# Patient Record
Sex: FEMALE | Race: BLACK OR AFRICAN AMERICAN | Employment: OTHER | URBAN - METROPOLITAN AREA
[De-identification: names, ages, dates, MRNs, and addresses within clinical notes are randomized per-mention and may not be internally consistent; named-entity substitution may affect disease eponyms.]

---

## 2024-09-24 ENCOUNTER — OFFICE VISIT (OUTPATIENT)
Dept: ORTHOPEDIC SURGERY | Age: 74
End: 2024-09-24
Payer: MEDICARE

## 2024-09-24 DIAGNOSIS — M47.812 FACET ARTHRITIS OF CERVICAL REGION: Primary | ICD-10-CM

## 2024-09-24 PROCEDURE — 64491 INJ PARAVERT F JNT C/T 2 LEV: CPT | Performed by: ANESTHESIOLOGY

## 2024-09-24 PROCEDURE — 64490 INJ PARAVERT F JNT C/T 1 LEV: CPT | Performed by: ANESTHESIOLOGY

## 2024-09-24 RX ORDER — METHYLPREDNISOLONE ACETATE 40 MG/ML
40 INJECTION, SUSPENSION INTRA-ARTICULAR; INTRALESIONAL; INTRAMUSCULAR; SOFT TISSUE ONCE
Status: COMPLETED | OUTPATIENT
Start: 2024-09-24 | End: 2024-09-24

## 2024-09-24 RX ADMIN — METHYLPREDNISOLONE ACETATE 40 MG: 40 INJECTION, SUSPENSION INTRA-ARTICULAR; INTRALESIONAL; INTRAMUSCULAR; SOFT TISSUE at 11:41

## 2024-10-21 ENCOUNTER — OFFICE VISIT (OUTPATIENT)
Age: 74
End: 2024-10-21
Payer: MEDICARE

## 2024-10-21 DIAGNOSIS — M47.812 FACET ARTHRITIS OF CERVICAL REGION: Primary | ICD-10-CM

## 2024-10-21 PROCEDURE — 99214 OFFICE O/P EST MOD 30 MIN: CPT | Performed by: PHYSICIAN ASSISTANT

## 2024-10-21 PROCEDURE — 1123F ACP DISCUSS/DSCN MKR DOCD: CPT | Performed by: PHYSICIAN ASSISTANT

## 2024-10-21 RX ORDER — TIZANIDINE 2 MG/1
2 TABLET ORAL 3 TIMES DAILY PRN
Qty: 90 TABLET | Refills: 2 | Status: SHIPPED | OUTPATIENT
Start: 2024-10-21 | End: 2025-01-19

## 2024-10-21 ASSESSMENT — ENCOUNTER SYMPTOMS
SHORTNESS OF BREATH: 0
EYES NEGATIVE: 1
ALLERGIC/IMMUNOLOGIC NEGATIVE: 1
ABDOMINAL PAIN: 0

## 2024-10-21 NOTE — PATIENT INSTRUCTIONS
Pain Management Pre-procedure Instructions     MRN: Demetrice Munoz      : 1950    PROVIDER: Peng Mg MD   LOCATION:  [address] [place] [address]     Prior to Procedure   If you develop infectious symptoms (fever, cough, etc.) or are placed on antibiotics, call office at [phone]. You will be transferred to the medical assistant who will discuss your symptoms with the physician. The physician will decide if the procedure needs to be rescheduled.   If you need to reschedule your procedure for any reason, called the office at [phone] at least 24 hours before your scheduled procedure time.   ? If this box is checked, you are on a blood thinner ( ).   You DO/DO NOT need to stop this medication ( days) before your procedure.   ? If this box is checked, we will require clearance by your cardiologist or primary care   before discontinuing your blood thinner ( ). Someone from our   office will call you with instructions on when to stop the medication. DO NOT stop the medication on your own.       Day of Procedure   You must arrive AT LEAST 30 minutes prior to your scheduled procedure to allow for check-in/preparation. Failure to arrive 30 minutes early WILL cause your procedure to be rescheduled.   You can expect to be at the facility for about 1-1.5 hours, although the procedure itself is typically short.   You may shower, dress, and wear makeup as you normally would. Please remove fingernail polish from at least one index finger.   Take all prescribed medications other than medications you have been specifically told to stop. If you need to reschedule your procedure for any reason, called the office at [phone] at least 24 hours before your scheduled procedure time.   If you are diabetic, record your blood sugar in the morning and bring it with you to your procedure.

## 2024-10-21 NOTE — PROGRESS NOTES
Chronic Pain Consult Note      Plan:     A comprehensive pain management plan may consist of the following: Testing, Therapy, Medications, Interventions, Consults, and Follow up.    Cervical Facet Arthritis  Chronic now by definition of >3 months duration.   S/p cervical facet steroid injections bilateral C2/3 and 3/4.  S/p C2/3 facet steroid injections and R C3/4 facet steroid injections with 100% pain relief x 3 weeks.  Plan for MBB bilaterally at C2/3 and C3/4 with goal of transient pain relief x 2 injections. Goal is 80% pain reduction for duration of anesthetic. If successful, we will proceed with MBB #2 with goal of RFA. Expectations of MBB discussed with patient including going and doing something that is typically difficult to do for him the day of the procedure to gauge effectiveness. If not effective, we do not proceed with MBB #2. Patient should continue previously taught HEP.   She has completed conservative care including >3 months of physical therapy.   Initiate Tizanidine 2mg up to three times daily watching for sedation.   Initiate Voltaren gel topically up to four times daily.   Recommend heat and gentle stretching 2-3 times per day for 15-20 minutes.   MRI reviewed.  Notes from procedure reviewed.     General Recommendations: The pain condition that the patient suffers from is best treated with a multidisciplinary approach that involves an increase in physical activity to prevent de-conditioning and worsening of the pain cycle, as well as psychological counseling (formal and/or informal) to address the co morbid psychological effects of pain. Treatment will often involve judicious use of pain medications and interventional procedures to decrease the pain, allowing the patient to participate in the physical activity that will ultimately produce long-lasting pain reductions. The goal of the multidisciplinary approach is to return the patient to a higher level of overall function and to restore their

## 2024-12-24 NOTE — PROGRESS NOTES
Procedure Date: 2025  Location: [facility]        Procedure: Bilateral C2/3, C3/4 MBB #1       Time Out performed prior to start of the procedure:       Peng Mg M.D.  performed the following reviews on Demetrice Munoz 1950 prior to the start of the procedure:       patient was identified by name and     agreement on procedure being performed was verified   risks and benefits explained to patient by the provider  procedure site verified as Bilateral  patient was positioned for comfort   consent signed and verified for procedure       Time:  9:00 AM        Procedure performed by:   Peng Mg M.D.       Patient assisted by:   JENIFER PARKER MA

## 2025-01-02 ENCOUNTER — OFFICE VISIT (OUTPATIENT)
Dept: ORTHOPEDIC SURGERY | Age: 75
End: 2025-01-02
Payer: MEDICARE

## 2025-01-02 DIAGNOSIS — M47.812 CERVICAL SPONDYLOSIS WITHOUT MYELOPATHY: Primary | ICD-10-CM

## 2025-01-02 PROCEDURE — 64491 INJ PARAVERT F JNT C/T 2 LEV: CPT | Performed by: ANESTHESIOLOGY

## 2025-01-02 PROCEDURE — 64490 INJ PARAVERT F JNT C/T 1 LEV: CPT | Performed by: ANESTHESIOLOGY

## 2025-01-02 NOTE — PROGRESS NOTES
DACMBBBL   Name: Demetrice Munoz   MRN:[id number]   :1950    [facility]: [facility]    Procedure: Bilateral Cervical Medial Branch Block at C2-4 Under Fluoroscopic Imaging     Pre-op Diagnosis: Cervical Spondylosis without Myelopathy    Post-op Diagnosis: Same     Anesthesia: Local only     Complications: None    After confirming written and informed consent and discussing the risk, benefits and alternatives for the procedure, the patient had the correct site marked by the physician performing the procedure. The specific risks of bleeding and infection were discussed. The patient was taken to the fluoroscopy suite. A pulse oximeter was placed, and visual and verbal monitoring were maintained throughout the procedure. The patient was then placed in the prone position. The skin overlying the cervical spine was then prepped with chlorhexidine gluconate and a sterile drape was placed. The hands were cleaned with an alcohol-containing solution. Sterile gloves were then worn. A time out was then performed involving the physician, radiation technologist and the patient.    A posterior view of the right C2 articular pillar was then obtained. The skin overlying this area was anesthetized with 0.2 ml of 1% lidocaine using a 25 G 1.5 inch needle. Next, using intermittent fluoroscopic guidance, a 25 G, 2 inch Quincke needle was advanced using a coaxial technique toward the middle one-third section of the pillar with respect to the cephalad/caudad relationship and anterior/posterior relationship of the pillar. Satisfactory needle position was confirmed on AP, lateral, and oblique visualizations. 0.3 ml of 0.25% bupivacaine was then injected.    A posterior view of the right C3 articular pillar was then obtained. The skin overlying this area was anesthetized with 0.2 ml of 1% lidocaine using a 25 G 1.5 inch needle. Next, using intermittent fluoroscopic guidance, a 25 G, 2 inch Quincke needle was advanced using a coaxial

## 2025-01-03 ENCOUNTER — TELEPHONE (OUTPATIENT)
Age: 75
End: 2025-01-03
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
## 2025-01-09 ENCOUNTER — OFFICE VISIT (OUTPATIENT)
Dept: ORTHOPEDIC SURGERY | Age: 75
End: 2025-01-09
Payer: MEDICARE

## 2025-01-09 DIAGNOSIS — M47.812 CERVICAL SPONDYLOSIS WITHOUT MYELOPATHY: Primary | ICD-10-CM

## 2025-01-09 PROCEDURE — 64491 INJ PARAVERT F JNT C/T 2 LEV: CPT | Performed by: ANESTHESIOLOGY

## 2025-01-09 PROCEDURE — 64490 INJ PARAVERT F JNT C/T 1 LEV: CPT | Performed by: ANESTHESIOLOGY

## 2025-01-09 NOTE — PROGRESS NOTES
Procedure Date: 2025      Location: [facility]       Procedure: Bilateral Cervical Medial Branch Block at C2-4        Time Out performed prior to start of the procedure:       Peng Mg M.D.  performed the following reviews on Demetrice Munoz 1950 prior to the start of the procedure:       patient was identified by name and     agreement on procedure being performed was verified   risks and benefits explained to patient by the provider  procedure site verified as Bilateral  patient was positioned for comfort   consent signed and verified for procedure       Time:  10:32 AM        Procedure performed by:   Peng Mg M.D.       Patient assisted by:   JOEL VIRGEN MA     
and oblique visualizations. 0.3 ml of 0.25% bupivacaine was then injected.    A posterior view of the left C4 articular pillar was then obtained. The skin overlying this area was anesthetized with 0.2 ml of 1% lidocaine using a 25 G 1.5 inch needle. Next, using intermittent fluoroscopic guidance, a 25 G, 2 inch Quincke needle was advanced using a coaxial technique toward the middle one-third section of the pillar with respect to the cephalad/caudad relationship and anterior/posterior relationship of the pillar. Satisfactory needle position was confirmed on AP, lateral, and oblique visualizations. 0.3 ml of 0.25% bupivacaine was then injected.    Band-Aids were applied. The patient was transported to the recovery room and monitored for an appropriate amount of time, and after meeting discharge criteria, was discharged home with a . No complications were noted through the procedure or recovery period.    Plan: The patient will complete a pain journal. If the patient receives at least 70% pain reduction, we will discuss pursuing a second block of the medial branches using lidocaine to create a differential block.

## 2025-01-13 ENCOUNTER — OFFICE VISIT (OUTPATIENT)
Age: 75
End: 2025-01-13
Payer: MEDICARE

## 2025-01-13 DIAGNOSIS — M79.18 MYALGIA, OTHER SITE: Primary | ICD-10-CM

## 2025-01-13 PROCEDURE — 99213 OFFICE O/P EST LOW 20 MIN: CPT | Performed by: ANESTHESIOLOGY

## 2025-01-13 PROCEDURE — 1090F PRES/ABSN URINE INCON ASSESS: CPT | Performed by: ANESTHESIOLOGY

## 2025-01-13 PROCEDURE — G8400 PT W/DXA NO RESULTS DOC: HCPCS | Performed by: ANESTHESIOLOGY

## 2025-01-13 PROCEDURE — G8421 BMI NOT CALCULATED: HCPCS | Performed by: ANESTHESIOLOGY

## 2025-01-13 PROCEDURE — 4004F PT TOBACCO SCREEN RCVD TLK: CPT | Performed by: ANESTHESIOLOGY

## 2025-01-13 PROCEDURE — 1159F MED LIST DOCD IN RCRD: CPT | Performed by: ANESTHESIOLOGY

## 2025-01-13 PROCEDURE — 1123F ACP DISCUSS/DSCN MKR DOCD: CPT | Performed by: ANESTHESIOLOGY

## 2025-01-13 PROCEDURE — G8427 DOCREV CUR MEDS BY ELIG CLIN: HCPCS | Performed by: ANESTHESIOLOGY

## 2025-01-13 PROCEDURE — 3017F COLORECTAL CA SCREEN DOC REV: CPT | Performed by: ANESTHESIOLOGY

## 2025-01-13 RX ORDER — METHOCARBAMOL 750 MG/1
750 TABLET, FILM COATED ORAL 3 TIMES DAILY
Qty: 90 TABLET | Refills: 1 | Status: SHIPPED | OUTPATIENT
Start: 2025-01-13 | End: 2025-03-14

## 2025-01-13 ASSESSMENT — ENCOUNTER SYMPTOMS
SHORTNESS OF BREATH: 0
ALLERGIC/IMMUNOLOGIC NEGATIVE: 1
EYES NEGATIVE: 1
ABDOMINAL PAIN: 0

## 2025-01-13 NOTE — PROGRESS NOTES
Chronic Pain Consult Note      Plan:     A comprehensive pain management plan may consist of the following: Testing, Therapy, Medications, Interventions, Consults, and Follow up.    Myalgia other site  Return for bilateral cervical paraspinous TPI's  Initiate Robaxin-750 milligrams 3 times daily as needed  Cervical Facet Arthritis  Chronic now by definition of >3 months duration.   S/p cervical facet steroid injections bilateral C2/3 and 3/4.  S/p C2/3 facet steroid injections and R C3/4 facet steroid injections with 100% pain relief x 3 weeks.  Status post #1 and #2 MBB bilaterally at C2/3 and C3/4   Effective pain relief, over 80% relief.  Degree of relief has persisted and therefore we will avoid proceeding with RFA at this time  She has completed conservative care including >3 months of physical therapy.   Discontinue tizanidine due to lack of benefit  Initiate Voltaren gel topically up to four times daily.   Recommend heat and gentle stretching 2-3 times per day for 15-20 minutes.   MRI reviewed.  Notes from procedure reviewed.     General Recommendations: The pain condition that the patient suffers from is best treated with a multidisciplinary approach that involves an increase in physical activity to prevent de-conditioning and worsening of the pain cycle, as well as psychological counseling (formal and/or informal) to address the co morbid psychological effects of pain. Treatment will often involve judicious use of pain medications and interventional procedures to decrease the pain, allowing the patient to participate in the physical activity that will ultimately produce long-lasting pain reductions. The goal of the multidisciplinary approach is to return the patient to a higher level of overall function and to restore their ability to perform activities of daily living.      Referring Provider: No ref. provider found  Assessment:      Chief Complaint: No chief complaint on file.      Demetrice Egan  [facility]  [address] [place] [address]  [phone]     Appointment:  7/19/2022 at 9:00am.

## 2025-01-22 ENCOUNTER — OFFICE VISIT (OUTPATIENT)
Age: 75
End: 2025-01-22
Payer: MEDICARE

## 2025-01-22 DIAGNOSIS — M79.18 MYALGIA, OTHER SITE: Primary | ICD-10-CM

## 2025-01-22 PROCEDURE — 20552 NJX 1/MLT TRIGGER POINT 1/2: CPT | Performed by: ANESTHESIOLOGY

## 2025-01-22 RX ORDER — BUPIVACAINE HYDROCHLORIDE 5 MG/ML
10 INJECTION, SOLUTION EPIDURAL; INTRACAUDAL ONCE
Status: COMPLETED | OUTPATIENT
Start: 2025-01-22 | End: 2025-01-22

## 2025-01-22 RX ADMIN — BUPIVACAINE HYDROCHLORIDE 50 MG: 5 INJECTION, SOLUTION EPIDURAL; INTRACAUDAL at 16:22

## 2025-01-22 NOTE — PROGRESS NOTES
NAME: Demetrice Munoz   ID:[id number]   :1950  DOS:2025    Procedure: Trigger Point Injections into: Bilateral cervical paraspinous muscles    Pre-op Diagnosis: Myalgia    Post-op Diagnosis: Same     Anesthesia: Local only     Complications: None     Indication: Myalgia    Procedure note:  After informed written consent was obtained from the patient. The patient was placed in a seated position, leaning forward. The above muscle trigger points were identified with the patient's assistance and marked. A total of 2 sites were identified. Each site was prepped with chlorohexidine. Each site was injected using a 25 or 27-gauge needle after negative aspiration. Total of 1cc of 0.25% bupivacaine was injected at each site, needle manipulated and 10sec of pressure was applied. The patient tolerated the procedure well. There were no obvious complications were encountered. The patient was discharged in an awake and alert condition.      Pre/Post percentage relief: 50%      Plan: The patient will follow-up with me in approximately 3 to 4 weeks to determine efficacy as well as further treatment regimen.     Peng Mg MD

## 2025-01-22 NOTE — PROGRESS NOTES
Procedure Date: 2025      Location: [facility]       Procedure: CERVICAL TPI       Time Out performed prior to start of the procedure:       Peng Mg M.D.  performed the following reviews on Demetrice Munoz 1950 prior to the start of the procedure:       patient was identified by name and     agreement on procedure being performed was verified   risks and benefits explained to patient by the provider  procedure site verified as Bilateral  patient was positioned for comfort   consent signed and verified for procedure       Time:  2:00 PM        Procedure performed by:   Peng Mg M.D.       Patient assisted by:   JENIFER PARKER MA

## 2025-03-18 ENCOUNTER — OFFICE VISIT (OUTPATIENT)
Age: 75
End: 2025-03-18
Payer: MEDICARE

## 2025-03-18 DIAGNOSIS — M47.812 FACET ARTHRITIS OF CERVICAL REGION: ICD-10-CM

## 2025-03-18 DIAGNOSIS — M79.18 MYALGIA, OTHER SITE: Primary | ICD-10-CM

## 2025-03-18 PROCEDURE — G8400 PT W/DXA NO RESULTS DOC: HCPCS | Performed by: PHYSICIAN ASSISTANT

## 2025-03-18 PROCEDURE — 4004F PT TOBACCO SCREEN RCVD TLK: CPT | Performed by: PHYSICIAN ASSISTANT

## 2025-03-18 PROCEDURE — 1090F PRES/ABSN URINE INCON ASSESS: CPT | Performed by: PHYSICIAN ASSISTANT

## 2025-03-18 PROCEDURE — G8427 DOCREV CUR MEDS BY ELIG CLIN: HCPCS | Performed by: PHYSICIAN ASSISTANT

## 2025-03-18 PROCEDURE — 3017F COLORECTAL CA SCREEN DOC REV: CPT | Performed by: PHYSICIAN ASSISTANT

## 2025-03-18 PROCEDURE — 1123F ACP DISCUSS/DSCN MKR DOCD: CPT | Performed by: PHYSICIAN ASSISTANT

## 2025-03-18 PROCEDURE — 1159F MED LIST DOCD IN RCRD: CPT | Performed by: PHYSICIAN ASSISTANT

## 2025-03-18 PROCEDURE — 99214 OFFICE O/P EST MOD 30 MIN: CPT | Performed by: PHYSICIAN ASSISTANT

## 2025-03-18 PROCEDURE — G8421 BMI NOT CALCULATED: HCPCS | Performed by: PHYSICIAN ASSISTANT

## 2025-03-18 RX ORDER — MELOXICAM 7.5 MG/1
TABLET ORAL
COMMUNITY
Start: 2025-02-11

## 2025-03-18 RX ORDER — METHOCARBAMOL 750 MG/1
750 TABLET, FILM COATED ORAL 3 TIMES DAILY PRN
Qty: 90 TABLET | Refills: 1 | Status: SHIPPED | OUTPATIENT
Start: 2025-03-18 | End: 2025-05-17

## 2025-03-18 RX ORDER — SPIRONOLACTONE 25 MG/1
25 TABLET ORAL DAILY
COMMUNITY
Start: 2025-02-28 | End: 2025-03-30

## 2025-03-18 RX ORDER — ACORAMIDIS HYDROCHLORIDE 356 MG/1
712 TABLET, FILM COATED ORAL 2 TIMES DAILY
COMMUNITY
Start: 2025-03-12

## 2025-03-18 RX ORDER — FUROSEMIDE 20 MG/1
20 TABLET ORAL DAILY
COMMUNITY
Start: 2025-02-25

## 2025-03-18 ASSESSMENT — ENCOUNTER SYMPTOMS
ALLERGIC/IMMUNOLOGIC NEGATIVE: 1
EYES NEGATIVE: 1
SHORTNESS OF BREATH: 0
ABDOMINAL PAIN: 0

## 2025-03-18 NOTE — PROGRESS NOTES
Ms Munoz is a f/u for chronic neck pain with TPIs performed in the cervical paraspinals. She has undergone MBB x 2 with excellent pain relief. The last one had given her some lasting relief and so RFA was put on hold.  She was also trialed on Robaxin 750mg.   
Ms Munoz is a f/u for chronic neck pain with TPIs performed in the cervical paraspinals. She has undergone MBB x 2 with excellent pain relief. The last one had given her some lasting relief and so RFA was put on hold.  She was also trialed on Robaxin 750mg. She feels like the TPIs did give her pain relief. She was referred to PT. She reports that her L knee, leg and foot became acutely swollen. She was referred to orthopedics. He looked at her knee and ordered xray and MRI which was negative for blood clot. The L foot continued to swell to the pain that she could not get it in a shoe. She was referred to CT scan and while walking into the hospital, she suffered a heart attack. She saw her PCP and who found a MI on EKG and she was transferred to the hospital. 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 She was put on Acoramidis 712mg BID.  She had to hold PT but would need to get back. She would like to get back to PT and start working with Kareen at [facility].   
of Systems   Constitutional:  Negative for chills, fatigue, fever and unexpected weight change.   HENT:  Negative for congestion and ear pain.    Eyes: Negative.    Respiratory:  Negative for shortness of breath.    Cardiovascular:  Negative for chest pain.   Gastrointestinal:  Negative for abdominal pain.   Endocrine: Negative.    Genitourinary: Negative.    Skin:  Negative for rash.   Allergic/Immunologic: Negative.    Neurological:  Negative for dizziness.   Hematological: Negative.    Psychiatric/Behavioral: Negative.           All 11 systems reviewed and were negative.          The [place] database for controlled substance prescription monitoring was reviewed.    Date: March 18, 2025  Patient Name: Demetrice Munoz  MRN:[id number]  PCP: Unknown, Provider    I personally performed the HPI, exam, and assessment/plan, verified the documentation and approve it is updated, accurate, and complete. Parts or the entirety of this document were transcribed utilizing voice recognition software. Transcription errors may be present.    Yuko Pratt PA-C

## 2025-04-28 NOTE — PROGRESS NOTES
Mrs. Munoz is a 6-week follow-up for chronic cervical arthritis as well as musculoskeletal pain affecting the trapezius muscles.  She has undergone 2 cervical MBB's with over 80% pain relief. Ablation was post poned as pain relief was persisting. She reports ***. She is using Robaxin 750mg as needed for pain.

## 2025-04-29 ENCOUNTER — OFFICE VISIT (OUTPATIENT)
Age: 75
End: 2025-04-29
Payer: MEDICARE

## 2025-04-29 DIAGNOSIS — M79.18 MYALGIA, OTHER SITE: ICD-10-CM

## 2025-04-29 PROCEDURE — 1123F ACP DISCUSS/DSCN MKR DOCD: CPT | Performed by: PHYSICIAN ASSISTANT

## 2025-04-29 PROCEDURE — 99214 OFFICE O/P EST MOD 30 MIN: CPT | Performed by: PHYSICIAN ASSISTANT

## 2025-04-29 PROCEDURE — 4004F PT TOBACCO SCREEN RCVD TLK: CPT | Performed by: PHYSICIAN ASSISTANT

## 2025-04-29 PROCEDURE — G8427 DOCREV CUR MEDS BY ELIG CLIN: HCPCS | Performed by: PHYSICIAN ASSISTANT

## 2025-04-29 PROCEDURE — 3017F COLORECTAL CA SCREEN DOC REV: CPT | Performed by: PHYSICIAN ASSISTANT

## 2025-04-29 PROCEDURE — 1159F MED LIST DOCD IN RCRD: CPT | Performed by: PHYSICIAN ASSISTANT

## 2025-04-29 PROCEDURE — 1090F PRES/ABSN URINE INCON ASSESS: CPT | Performed by: PHYSICIAN ASSISTANT

## 2025-04-29 PROCEDURE — G8421 BMI NOT CALCULATED: HCPCS | Performed by: PHYSICIAN ASSISTANT

## 2025-04-29 PROCEDURE — G8400 PT W/DXA NO RESULTS DOC: HCPCS | Performed by: PHYSICIAN ASSISTANT

## 2025-04-29 RX ORDER — METHOCARBAMOL 750 MG/1
750 TABLET, FILM COATED ORAL 3 TIMES DAILY PRN
Qty: 270 TABLET | Refills: 0 | Status: SHIPPED | OUTPATIENT
Start: 2025-04-29 | End: 2025-10-26

## 2025-04-29 ASSESSMENT — ENCOUNTER SYMPTOMS
ALLERGIC/IMMUNOLOGIC NEGATIVE: 1
EYES NEGATIVE: 1
ABDOMINAL PAIN: 0
SHORTNESS OF BREATH: 0

## 2025-04-29 NOTE — PROGRESS NOTES
Chronic Pain Consult Note      Plan:     A comprehensive pain management plan may consist of the following: Testing, Therapy, Medications, Interventions, Consults, and Follow up.    Myalgia other site  Status post cervical TPI's which did give her benefit and are still providing some ongoing benefit.  Refill Robaxin-750 milligrams 3 times 90 days   Continue PT and f/u in 2 months.   Cervical Facet Arthritis  Chronic now by definition of >3 months duration.   S/p cervical facet steroid injections bilateral C2/3 and 3/4.   S/p C2/3 facet steroid injections and R C3/4 facet steroid injections with 100% pain relief x 3 weeks.  Status post #1 and #2 MBB bilaterally at C2/3 and C3/4   Effective pain relief, over 80% relief.  Degree of relief has persisted and therefore we will avoid proceeding with RFA at this time  She has completed conservative care including >3 months of physical therapy.   Discontinue tizanidine due to lack of benefit  Continue as needed Voltaren gel topically up to four times daily.   Recommend heat and gentle stretching 2-3 times per day for 15-20 minutes.   MRI reviewed.  Notes from procedure reviewed.     General Recommendations: The pain condition that the patient suffers from is best treated with a multidisciplinary approach that involves an increase in physical activity to prevent de-conditioning and worsening of the pain cycle, as well as psychological counseling (formal and/or informal) to address the co morbid psychological effects of pain. Treatment will often involve judicious use of pain medications and interventional procedures to decrease the pain, allowing the patient to participate in the physical activity that will ultimately produce long-lasting pain reductions. The goal of the multidisciplinary approach is to return the patient to a higher level of overall function and to restore their ability to perform activities of daily living.      Referring Provider: No ref. provider

## 2025-07-18 ENCOUNTER — OFFICE VISIT (OUTPATIENT)
Age: 75
End: 2025-07-18
Payer: MEDICARE

## 2025-07-18 DIAGNOSIS — M79.18 MYALGIA, OTHER SITE: ICD-10-CM

## 2025-07-18 DIAGNOSIS — M47.812 FACET ARTHRITIS OF CERVICAL REGION: ICD-10-CM

## 2025-07-18 PROCEDURE — G8400 PT W/DXA NO RESULTS DOC: HCPCS | Performed by: PHYSICIAN ASSISTANT

## 2025-07-18 PROCEDURE — 3017F COLORECTAL CA SCREEN DOC REV: CPT | Performed by: PHYSICIAN ASSISTANT

## 2025-07-18 PROCEDURE — 1159F MED LIST DOCD IN RCRD: CPT | Performed by: PHYSICIAN ASSISTANT

## 2025-07-18 PROCEDURE — 4004F PT TOBACCO SCREEN RCVD TLK: CPT | Performed by: PHYSICIAN ASSISTANT

## 2025-07-18 PROCEDURE — G8421 BMI NOT CALCULATED: HCPCS | Performed by: PHYSICIAN ASSISTANT

## 2025-07-18 PROCEDURE — 1123F ACP DISCUSS/DSCN MKR DOCD: CPT | Performed by: PHYSICIAN ASSISTANT

## 2025-07-18 PROCEDURE — 99214 OFFICE O/P EST MOD 30 MIN: CPT | Performed by: PHYSICIAN ASSISTANT

## 2025-07-18 PROCEDURE — G8427 DOCREV CUR MEDS BY ELIG CLIN: HCPCS | Performed by: PHYSICIAN ASSISTANT

## 2025-07-18 PROCEDURE — 1090F PRES/ABSN URINE INCON ASSESS: CPT | Performed by: PHYSICIAN ASSISTANT

## 2025-07-18 RX ORDER — METHOCARBAMOL 750 MG/1
750 TABLET, FILM COATED ORAL 3 TIMES DAILY PRN
Qty: 270 TABLET | Refills: 1 | Status: SHIPPED | OUTPATIENT
Start: 2025-07-18 | End: 2026-01-14

## 2025-07-18 ASSESSMENT — ENCOUNTER SYMPTOMS
ABDOMINAL PAIN: 0
EYES NEGATIVE: 1
SHORTNESS OF BREATH: 0
ALLERGIC/IMMUNOLOGIC NEGATIVE: 1

## 2025-07-18 NOTE — PROGRESS NOTES
Chronic Pain Consult Note      Plan:     A comprehensive pain management plan may consist of the following: Testing, Therapy, Medications, Interventions, Consults, and Follow up.    Myalgia other site  Status post cervical TPIs which did give her benefit and are still providing some ongoing benefit.  Refill Robaxin-750 milligrams 3 times 90 days with 1 refill   She will continue Pilates and she will f/u in six months. If she needs a referral to Kareen at [facility] on [address] again, I am happy to send her again for a tune up.   Cervical Facet Arthritis  Chronic now by definition of >3 months duration.   S/p cervical facet steroid injections bilateral C2/3 and 3/4.   S/p C2/3 facet steroid injections and R C3/4 facet steroid injections with 100% pain relief x 3 weeks.  Status post #1 and #2 MBB bilaterally at C2/3 and C3/4   Effective pain relief, over 80% relief.  Degree of relief has persisted and therefore we will avoid proceeding with RFA at this time  She has completed conservative care including >3 months of physical therapy.   Discontinue tizanidine due to lack of benefit  Continue as needed Voltaren gel topically up to four times daily.   Recommend heat and gentle stretching 2-3 times per day for 15-20 minutes.   MRI reviewed.  Notes from procedure reviewed.     General Recommendations: The pain condition that the patient suffers from is best treated with a multidisciplinary approach that involves an increase in physical activity to prevent de-conditioning and worsening of the pain cycle, as well as psychological counseling (formal and/or informal) to address the co morbid psychological effects of pain. Treatment will often involve judicious use of pain medications and interventional procedures to decrease the pain, allowing the patient to participate in the physical activity that will ultimately produce long-lasting pain reductions. The goal of the multidisciplinary approach is to return the patient to a higher level